# Patient Record
Sex: FEMALE | Race: WHITE | NOT HISPANIC OR LATINO | Employment: OTHER | URBAN - METROPOLITAN AREA
[De-identification: names, ages, dates, MRNs, and addresses within clinical notes are randomized per-mention and may not be internally consistent; named-entity substitution may affect disease eponyms.]

---

## 2023-07-18 ENCOUNTER — NURSING HOME VISIT (OUTPATIENT)
Dept: POST ACUTE CARE | Facility: EXTERNAL LOCATION | Age: 78
End: 2023-07-18
Payer: MEDICARE

## 2023-07-18 VITALS
DIASTOLIC BLOOD PRESSURE: 74 MMHG | BODY MASS INDEX: 24.45 KG/M2 | RESPIRATION RATE: 18 BRPM | SYSTOLIC BLOOD PRESSURE: 122 MMHG | HEIGHT: 63 IN | TEMPERATURE: 97.7 F | HEART RATE: 71 BPM | WEIGHT: 138 LBS | OXYGEN SATURATION: 95 %

## 2023-07-18 DIAGNOSIS — I48.91 ATRIAL FIBRILLATION WITH RVR (MULTI): ICD-10-CM

## 2023-07-18 DIAGNOSIS — Z74.09 IMPAIRED FUNCTIONAL MOBILITY AND ENDURANCE: ICD-10-CM

## 2023-07-18 DIAGNOSIS — M62.838 MUSCLE SPASM OF RIGHT LEG: ICD-10-CM

## 2023-07-18 DIAGNOSIS — J18.9 COMMUNITY ACQUIRED PNEUMONIA OF LEFT LOWER LOBE OF LUNG: Primary | ICD-10-CM

## 2023-07-18 DIAGNOSIS — Z79.01 CURRENT USE OF ANTICOAGULANT THERAPY: ICD-10-CM

## 2023-07-18 PROCEDURE — 99310 SBSQ NF CARE HIGH MDM 45: CPT | Performed by: PHYSICIAN ASSISTANT

## 2023-07-18 ASSESSMENT — ENCOUNTER SYMPTOMS
FEVER: 0
DYSPNEA ON EXERTION: 0
DIFFICULTY BREATHING: 0
SHORTNESS OF BREATH: 0
COUGH: 1
WHEEZING: 0

## 2023-07-18 ASSESSMENT — COPD QUESTIONNAIRES: COPD: 1

## 2023-07-18 NOTE — PROGRESS NOTES
"07/18/23  Name: Nafisa Freitas  YOB: 1945    Chief complaint: Pneumonia.    HPI: This is a 77 year old  female who has a medical history remarkable for atrial fibrillation, hypertension, CVA with R sided contractures, hyperlipidemia, tobacco dependence, COPD. And cardiomyopathy. Patient was seen in there ER for evaluation of worsening sob with cough over the last 4 days. Patient has been receiving Ketamine infusions for for history of contractures. Patient has received 4 such infusions but missed fifth dose due to present illness. ECG showed atrial fibrillation with RVR. Chest x-ray showed pneumonia  and she was successfully treated with antibiotics, steroids, oxygen, and bronchodilators. Cardiology started her on Cardizem initially for atrial fibrillation with RVR and later switching to Metoprolol succinate. Echocardiogram show ejection fraction of 40%. Cardiac catherization which showed nonobstructive cardiac artery disease. She improved and was discharged to SNF for rehab.    Pneumonia  She complains of cough. There is no difficulty breathing, shortness of breath or wheezing. This is a new problem. The current episode started in the past 7 days. The problem occurs daily. The problem has been gradually improving. The cough is non-productive. Pertinent negatives include no chest pain, dyspnea on exertion or fever. Her symptoms are aggravated by nothing. Relieved by: oxygen. She reports significant improvement on treatment. Risk factors for lung disease include smoking/tobacco exposure. Her past medical history is significant for COPD.     Review of systems:   ROS negative except were noted in HPI.    Code Status: full code    /74   Pulse 71   Temp 36.5 °C (97.7 °F)   Resp 18   Ht 1.6 m (5' 3\")   Wt 62.6 kg (138 lb)   SpO2 95%   BMI 24.45 kg/m²      Physical Exam  Constitutional:       General: She is not in acute distress.     Appearance: Normal appearance.   HENT:      Head: " Normocephalic.      Nose: Nose normal. No congestion.      Mouth/Throat:      Mouth: Mucous membranes are moist.   Eyes:      Extraocular Movements: Extraocular movements intact.      Pupils: Pupils are equal, round, and reactive to light.   Cardiovascular:      Rate and Rhythm: Normal rate and regular rhythm.      Pulses: Normal pulses.   Pulmonary:      Effort: Pulmonary effort is normal.      Breath sounds: Normal breath sounds.   Abdominal:      General: Bowel sounds are normal. There is no distension.      Palpations: Abdomen is soft.      Tenderness: There is no abdominal tenderness. There is no guarding.   Genitourinary:     Comments: Voiding    Musculoskeletal:         General: No swelling or deformity. Normal range of motion.      Cervical back: Normal range of motion.   Lymphadenopathy:      Cervical: No cervical adenopathy.   Skin:     General: Skin is warm and dry.   Neurological:      General: No focal deficit present.      Mental Status: She is alert and oriented to person, place, and time.   Psychiatric:         Mood and Affect: Mood normal.         Behavior: Behavior normal.        Medications reviewed during visit at facility.  Apixaban 5 mg po bid  Atorvastatin 40 mg po daily  Baclofen 5 mg po tid  Jublia External Solution 10 % (Efinaconazole) apply to toe nails daily  Aspirin 81 mg po daily  Ammonium lactate 12 % apply to Apply to BUE topically two times a day for dry skin and apply to BLE topically every morning.   Metoprolol succinate 50 mg po daily    Labs reviewed at facility:  07/14/2023  5:50 AM 07/14/2023  6:23 AM Final  07/14/2023  6:56 AM NA     Facility: [facility] [place] ([id number]  Accession: [id number]:[facility]  Specimen Source: NA    Admitting Provider: Dr. Nas Mtz  Ordered Date: 07/14/2023 5:50 AM  Body Site: NA    Attending Provider: Dr. Nas Mtz  Ordering Provider: Kimberly Arroyo  Patient Class: Inpatient Encounter      Test Result Reference Out Of Range  Units Corrected/Cancelled   GLUCOSE  75  74 - 99   mg/dL     SODIUM  135  136 - 145  Low  mmol/L     POTASSIUM  4.7  3.5 - 5.3   mmol/L     CHLORIDE  103  98 - 107   mmol/L     BICARBONATE  25  21 - 32   mmol/L     ANION GAP  12  10 - 20   mmol/L     UREA NITROGEN  15  6 - 23   mg/dL     CREATININE  0.79  0.50 - 1.05   mg/dL     eGFR FEMALE  78  >90   mL/min/1.73m2     CALCIUM 7.9 8.6 - 10.3 Low mg/dL   07/11/2023  5:33 AM 07/11/2023  6:11 AM Final  07/11/2023  6:15 AM NA     Facility: [facility] ([id number])  Accession: [id number]  Specimen Source: NA    Admitting Provider: Dr. Nas Mtz  Ordered Date: 07/11/2023 5:33 AM  Body Site:     Attending Provider: Dr. Nas Mtz  Ordering Provider: Mendy Gale  Patient Class: Inpatient Encounter      Test Result Reference Out Of Range Units Corrected/Cancelled   WBC  16.8  4.4 - 11.3  High  x10E9/L     NUCLEATED RBC  0.0  0.0 - 0.0   /100 WBC     RBC  3.53  4.00 - 5.20  Low  x10E12/L     HGB  11.0  12.0 - 16.0  Low  g/dL     HCT  34.1  36.0 - 46.0  Low  %     MCV  97  80 - 100   fL     MCHC  32.3  32.0 - 36.0   g/dL     PLT  354  150 - 450   x10E9/L     RDW-CV  13.4  11.5 - 14.5   %      Assessment/Plan    Problem List Items Addressed This Visit       Community acquired pneumonia of left lower lobe of lung - Primary     Complete antibiotic course. Supplemental oxygen as needed. Not wearing oxygen today.         Muscle spasm of right leg     Baclofen 5 mg po q 8 hours. Planning on Botox injections with  Roxana Salgado MD         Atrial fibrillation with RVR (CMS/HCC)     Metoprolol succinate 50 mg po daily. Review labs. Schedule cardiology follow up.         Current use of anticoagulant therapy     Apixaban 5 mg po bid         Impaired functional mobility and endurance     Order PT and OT to assess and treat.            Time:  I spent 45 minutes or greater with the patient. Greater than 50% of this time was spent in counseling and or  coordination of care. The time includes prep time of reviewing vital signs, report from direct nursing staff and or therapists, hospital documentation, reviewing labs, radiographs, diagnostic tests and or consultations, time directly spent with the patient interviewing, examining, and education regarding diagnosis, treatments, and medications, as well as documentation in the electronic medical record, and reviewing the plan of care and any new orders with the patient, nursing staff and other staff directly related to the patients care.      Nas Granados PA-C

## 2023-07-18 NOTE — LETTER
"Patient: Nafisa Freitas  : 1945    Encounter Date: 2023  Name: Nafisa Freitas  YOB: 1945    Chief complaint: Pneumonia.    HPI: This is a 77 year old  female who has a medical history remarkable for atrial fibrillation, hypertension, CVA with R sided contractures, hyperlipidemia, tobacco dependence, COPD. And cardiomyopathy. Patient was seen in there ER for evaluation of worsening sob with cough over the last 4 days. Patient has been receiving Ketamine infusions for for history of contractures. Patient has received 4 such infusions but missed fifth dose due to present illness. ECG showed atrial fibrillation with RVR. Chest x-ray showed pneumonia  and she was successfully treated with antibiotics, steroids, oxygen, and bronchodilators. Cardiology started her on Cardizem initially for atrial fibrillation with RVR and later switching to Metoprolol succinate. Echocardiogram show ejection fraction of 40%. Cardiac catherization which showed nonobstructive cardiac artery disease. She improved and was discharged to SNF for rehab.    Pneumonia  She complains of cough. There is no difficulty breathing, shortness of breath or wheezing. This is a new problem. The current episode started in the past 7 days. The problem occurs daily. The problem has been gradually improving. The cough is non-productive. Pertinent negatives include no chest pain, dyspnea on exertion or fever. Her symptoms are aggravated by nothing. Relieved by: oxygen. She reports significant improvement on treatment. Risk factors for lung disease include smoking/tobacco exposure. Her past medical history is significant for COPD.     Review of systems:   ROS negative except were noted in HPI.    Code Status: full code    /74   Pulse 71   Temp 36.5 °C (97.7 °F)   Resp 18   Ht 1.6 m (5' 3\")   Wt 62.6 kg (138 lb)   SpO2 95%   BMI 24.45 kg/m²      Physical Exam  Constitutional:       General: She is not in " acute distress.     Appearance: Normal appearance.   HENT:      Head: Normocephalic.      Nose: Nose normal. No congestion.      Mouth/Throat:      Mouth: Mucous membranes are moist.   Eyes:      Extraocular Movements: Extraocular movements intact.      Pupils: Pupils are equal, round, and reactive to light.   Cardiovascular:      Rate and Rhythm: Normal rate and regular rhythm.      Pulses: Normal pulses.   Pulmonary:      Effort: Pulmonary effort is normal.      Breath sounds: Normal breath sounds.   Abdominal:      General: Bowel sounds are normal. There is no distension.      Palpations: Abdomen is soft.      Tenderness: There is no abdominal tenderness. There is no guarding.   Genitourinary:     Comments: Voiding    Musculoskeletal:         General: No swelling or deformity. Normal range of motion.      Cervical back: Normal range of motion.   Lymphadenopathy:      Cervical: No cervical adenopathy.   Skin:     General: Skin is warm and dry.   Neurological:      General: No focal deficit present.      Mental Status: She is alert and oriented to person, place, and time.   Psychiatric:         Mood and Affect: Mood normal.         Behavior: Behavior normal.        Medications reviewed during visit at facility.  Apixaban 5 mg po bid  Atorvastatin 40 mg po daily  Baclofen 5 mg po tid  Jublia External Solution 10 % (Efinaconazole) apply to toe nails daily  Aspirin 81 mg po daily  Ammonium lactate 12 % apply to Apply to BUE topically two times a day for dry skin and apply to BLE topically every morning.   Metoprolol succinate 50 mg po daily    Labs reviewed at facility:  07/14/2023  5:50 AM 07/14/2023  6:23 AM Final  07/14/2023  6:56 AM NA     Facility: [facility] ([id number])  Accession: [id number]:[facility]  Specimen Source: NA    Admitting Provider: Dr. Nas Mtz  Ordered Date: 07/14/2023 5:50 AM  Body Site: NA    Attending Provider: Dr. Nas Mtz  Ordering Provider: Kimberly Arroyo  Patient  Class: Inpatient Encounter      Test Result Reference Out Of Range Units Corrected/Cancelled   GLUCOSE  75  74 - 99   mg/dL     SODIUM  135  136 - 145  Low  mmol/L     POTASSIUM  4.7  3.5 - 5.3   mmol/L     CHLORIDE  103  98 - 107   mmol/L     BICARBONATE  25  21 - 32   mmol/L     ANION GAP  12  10 - 20   mmol/L     UREA NITROGEN  15  6 - 23   mg/dL     CREATININE  0.79  0.50 - 1.05   mg/dL     eGFR FEMALE  78  >90   mL/min/1.73m2     CALCIUM 7.9 8.6 - 10.3 Low mg/dL   07/11/2023  5:33 AM 07/11/2023  6:11 AM Final  07/11/2023  6:15 AM NA     Facility: [facility] ([id number])  Accession: [id number]  Specimen Source: NA    Admitting Provider: Dr. Nas Mtz  Ordered Date: 07/11/2023 5:33 AM  Body Site: NA    Attending Provider: Dr. Nas Mtz  Ordering Provider: Mendy Gale  Patient Class: Inpatient Encounter      Test Result Reference Out Of Range Units Corrected/Cancelled   WBC  16.8  4.4 - 11.3  High  x10E9/L     NUCLEATED RBC  0.0  0.0 - 0.0   /100 WBC     RBC  3.53  4.00 - 5.20  Low  x10E12/L     HGB  11.0  12.0 - 16.0  Low  g/dL     HCT  34.1  36.0 - 46.0  Low  %     MCV  97  80 - 100   fL     MCHC  32.3  32.0 - 36.0   g/dL     PLT  354  150 - 450   x10E9/L     RDW-CV  13.4  11.5 - 14.5   %      Assessment/Plan   Problem List Items Addressed This Visit       Community acquired pneumonia of left lower lobe of lung - Primary     Complete antibiotic course. Supplemental oxygen as needed. Not wearing oxygen today.         Muscle spasm of right leg     Baclofen 5 mg po q 8 hours. Planning on Botox injections with  Roxana Salgado MD         Atrial fibrillation with RVR (CMS/HCC)     Metoprolol succinate 50 mg po daily. Review labs. Schedule cardiology follow up.         Current use of anticoagulant therapy     Apixaban 5 mg po bid         Impaired functional mobility and endurance     Order PT and OT to assess and treat.            Time:  I spent 45 minutes or greater with the patient.  Greater than 50% of this time was spent in counseling and or coordination of care. The time includes prep time of reviewing vital signs, report from direct nursing staff and or therapists, hospital documentation, reviewing labs, radiographs, diagnostic tests and or consultations, time directly spent with the patient interviewing, examining, and education regarding diagnosis, treatments, and medications, as well as documentation in the electronic medical record, and reviewing the plan of care and any new orders with the patient, nursing staff and other staff directly related to the patients care.      Nas Granados PA-C       Electronically Signed By: Nas Granados PA-C   7/18/23  8:38 PM

## 2023-07-20 ENCOUNTER — NURSING HOME VISIT (OUTPATIENT)
Dept: POST ACUTE CARE | Facility: EXTERNAL LOCATION | Age: 78
End: 2023-07-20
Payer: MEDICARE

## 2023-07-20 VITALS
TEMPERATURE: 98.2 F | HEART RATE: 74 BPM | BODY MASS INDEX: 24.45 KG/M2 | HEIGHT: 63 IN | SYSTOLIC BLOOD PRESSURE: 115 MMHG | DIASTOLIC BLOOD PRESSURE: 72 MMHG | WEIGHT: 138 LBS | OXYGEN SATURATION: 96 % | RESPIRATION RATE: 16 BRPM

## 2023-07-20 DIAGNOSIS — J18.9 COMMUNITY ACQUIRED PNEUMONIA OF LEFT LOWER LOBE OF LUNG: ICD-10-CM

## 2023-07-20 DIAGNOSIS — Z74.09 IMPAIRED FUNCTIONAL MOBILITY AND ENDURANCE: ICD-10-CM

## 2023-07-20 DIAGNOSIS — I48.91 ATRIAL FIBRILLATION WITH RVR (MULTI): Primary | ICD-10-CM

## 2023-07-20 DIAGNOSIS — Z79.01 CURRENT USE OF ANTICOAGULANT THERAPY: ICD-10-CM

## 2023-07-20 PROCEDURE — 99309 SBSQ NF CARE MODERATE MDM 30: CPT | Performed by: PHYSICIAN ASSISTANT

## 2023-07-20 NOTE — PROGRESS NOTES
"7/20/2023  Name: Nafisa Freitas  YOB: 1945    Chief complaint: Follow up atrial fibrillation.    HPI: Patient seen and examined in her room. She is seated at time of exam. Patient reports she feels well and offers no new complaints. Heart rhythm regular with ventricular rate of 74 bpm. Denies fever, chills, sob, chest pain, palpitation, or dizziness. Pulse oximetry 96% on room air. She is afebrile . Sleeping at night.    Heart Problem  This is a new problem. The current episode started 1 to 4 weeks ago. The problem has been resolved. Pertinent negatives include no abdominal pain, change in bowel habit, chest pain, fatigue, fever, headaches, urinary symptoms or vertigo. Nothing aggravates the symptoms. Treatments tried: beta blocker. The treatment provided significant relief.     Review of systems:   ROS negative except were noted in HPI.    Code Status: full code    /72   Pulse 74   Temp 36.8 °C (98.2 °F)   Resp 16   Ht 1.6 m (5' 3\")   Wt 62.6 kg (138 lb)   SpO2 96%   BMI 24.45 kg/m²      Physical Exam  Constitutional:       General: She is not in acute distress.     Appearance: Normal appearance.   HENT:      Head: Normocephalic.      Nose: Nose normal. No congestion.      Mouth/Throat:      Mouth: Mucous membranes are moist.   Eyes:      Extraocular Movements: Extraocular movements intact.      Pupils: Pupils are equal, round, and reactive to light.   Cardiovascular:      Rate and Rhythm: Normal rate and regular rhythm.      Pulses: Normal pulses.   Pulmonary:      Effort: Pulmonary effort is normal.      Breath sounds: Normal breath sounds.   Abdominal:      General: Bowel sounds are normal. There is no distension.      Palpations: Abdomen is soft.      Tenderness: There is no abdominal tenderness. There is no guarding.   Genitourinary:     Comments: Voiding     Musculoskeletal:         General: No swelling or deformity. Normal range of motion.      Cervical back: Normal range of " motion.   Lymphadenopathy:      Cervical: No cervical adenopathy.   Skin:     General: Skin is warm and dry.   Neurological:      General: No focal deficit present.      Mental Status: She is alert and oriented to person, place, and time.   Psychiatric:         Mood and Affect: Mood normal.         Behavior: Behavior normal.     Medications reviewed during visit at facility.  Apixaban 5 mg po bid  Atorvastatin 40 mg po daily  Baclofen 5 mg po tid  Jublia External Solution 10 % (Efinaconazole) apply to toe nails daily  Aspirin 81 mg po daily  Ammonium lactate 12 % apply to Apply to BUE topically two times a day for dry skin and apply to BLE topically every morning.   Metoprolol succinate 50 mg po daily  Labs reviewed at facility:    Assessment/Plan    Problem List Items Addressed This Visit       Community acquired pneumonia of left lower lobe of lung     Resolved. Monitor oxygen needs.          Atrial fibrillation with RVR (CMS/HCC) - Primary     Metoprolol succinate 50 mg po daily. Review labs. Schedule cardiology follow up.          Current use of anticoagulant therapy     Apixaban 5 mg po bid          Impaired functional mobility and endurance     Minimal assistance needed for ADL's. Ambulatory with steady gait. Discuss progress with physical therapy.            Time:    Nas Granados PA-C

## 2023-07-20 NOTE — LETTER
"Patient: Nafisa Freitas  : 1945    Encounter Date: 2023  Name: Nafisa Freitas  YOB: 1945    Chief complaint: Follow up atrial fibrillation.    HPI: Patient seen and examined in her room. She is seated at time of exam. Patient reports she feels well and offers no new complaints. Heart rhythm regular with ventricular rate of 74 bpm. Denies fever, chills, sob, chest pain, palpitation, or dizziness. Pulse oximetry 96% on room air. She is afebrile . Sleeping at night.    Heart Problem  This is a new problem. The current episode started 1 to 4 weeks ago. The problem has been resolved. Pertinent negatives include no abdominal pain, change in bowel habit, chest pain, fatigue, fever, headaches, urinary symptoms or vertigo. Nothing aggravates the symptoms. Treatments tried: beta blocker. The treatment provided significant relief.     Review of systems:   ROS negative except were noted in HPI.    Code Status: full code    /72   Pulse 74   Temp 36.8 °C (98.2 °F)   Resp 16   Ht 1.6 m (5' 3\")   Wt 62.6 kg (138 lb)   SpO2 96%   BMI 24.45 kg/m²      Physical Exam  Constitutional:       General: She is not in acute distress.     Appearance: Normal appearance.   HENT:      Head: Normocephalic.      Nose: Nose normal. No congestion.      Mouth/Throat:      Mouth: Mucous membranes are moist.   Eyes:      Extraocular Movements: Extraocular movements intact.      Pupils: Pupils are equal, round, and reactive to light.   Cardiovascular:      Rate and Rhythm: Normal rate and regular rhythm.      Pulses: Normal pulses.   Pulmonary:      Effort: Pulmonary effort is normal.      Breath sounds: Normal breath sounds.   Abdominal:      General: Bowel sounds are normal. There is no distension.      Palpations: Abdomen is soft.      Tenderness: There is no abdominal tenderness. There is no guarding.   Genitourinary:     Comments: Voiding     Musculoskeletal:         General: No swelling or " deformity. Normal range of motion.      Cervical back: Normal range of motion.   Lymphadenopathy:      Cervical: No cervical adenopathy.   Skin:     General: Skin is warm and dry.   Neurological:      General: No focal deficit present.      Mental Status: She is alert and oriented to person, place, and time.   Psychiatric:         Mood and Affect: Mood normal.         Behavior: Behavior normal.     Medications reviewed during visit at facility.  Apixaban 5 mg po bid  Atorvastatin 40 mg po daily  Baclofen 5 mg po tid  Jublia External Solution 10 % (Efinaconazole) apply to toe nails daily  Aspirin 81 mg po daily  Ammonium lactate 12 % apply to Apply to BUE topically two times a day for dry skin and apply to BLE topically every morning.   Metoprolol succinate 50 mg po daily  Labs reviewed at facility:    Assessment/Plan   Problem List Items Addressed This Visit       Community acquired pneumonia of left lower lobe of lung     Resolved. Monitor oxygen needs.          Atrial fibrillation with RVR (CMS/HCC) - Primary     Metoprolol succinate 50 mg po daily. Review labs. Schedule cardiology follow up.          Current use of anticoagulant therapy     Apixaban 5 mg po bid          Impaired functional mobility and endurance     Minimal assistance needed for ADL's. Ambulatory with steady gait. Discuss progress with physical therapy.            Time:    Nas Granados PA-C       Electronically Signed By: Nas Granados PA-C   7/23/23  3:40 PM

## 2023-07-23 ASSESSMENT — ENCOUNTER SYMPTOMS
FATIGUE: 0
VERTIGO: 0
FEVER: 0
CHANGE IN BOWEL HABIT: 0
ABDOMINAL PAIN: 0
HEADACHES: 0

## 2023-07-23 NOTE — ASSESSMENT & PLAN NOTE
Minimal assistance needed for ADL's. Ambulatory with steady gait. Discuss progress with physical therapy.

## 2023-11-13 ENCOUNTER — LAB (OUTPATIENT)
Dept: LAB | Facility: LAB | Age: 78
End: 2023-11-13
Payer: MEDICARE

## 2023-11-13 DIAGNOSIS — E78.5 HYPERLIPIDEMIA, UNSPECIFIED: ICD-10-CM

## 2023-11-13 DIAGNOSIS — I10 ESSENTIAL (PRIMARY) HYPERTENSION: Primary | ICD-10-CM

## 2023-11-13 DIAGNOSIS — Z13.29 ENCOUNTER FOR SCREENING FOR OTHER SUSPECTED ENDOCRINE DISORDER: ICD-10-CM

## 2023-11-13 LAB
ALBUMIN SERPL BCP-MCNC: 4 G/DL (ref 3.4–5)
ALP SERPL-CCNC: 82 U/L (ref 33–136)
ALT SERPL W P-5'-P-CCNC: 23 U/L (ref 7–45)
ANION GAP SERPL CALC-SCNC: 10 MMOL/L (ref 10–20)
AST SERPL W P-5'-P-CCNC: 20 U/L (ref 9–39)
BASOPHILS # BLD AUTO: 0.06 X10*3/UL (ref 0–0.1)
BASOPHILS NFR BLD AUTO: 0.9 %
BILIRUB SERPL-MCNC: 0.5 MG/DL (ref 0–1.2)
BUN SERPL-MCNC: 14 MG/DL (ref 6–23)
CALCIUM SERPL-MCNC: 9.2 MG/DL (ref 8.6–10.3)
CHLORIDE SERPL-SCNC: 106 MMOL/L (ref 98–107)
CHOLEST SERPL-MCNC: 119 MG/DL (ref 0–199)
CHOLESTEROL/HDL RATIO: 2.4
CO2 SERPL-SCNC: 31 MMOL/L (ref 21–32)
CREAT SERPL-MCNC: 1.08 MG/DL (ref 0.5–1.05)
EOSINOPHIL # BLD AUTO: 0.28 X10*3/UL (ref 0–0.4)
EOSINOPHIL NFR BLD AUTO: 4.3 %
ERYTHROCYTE [DISTWIDTH] IN BLOOD BY AUTOMATED COUNT: 13 % (ref 11.5–14.5)
GFR SERPL CREATININE-BSD FRML MDRD: 53 ML/MIN/1.73M*2
GLUCOSE SERPL-MCNC: 89 MG/DL (ref 74–99)
HCT VFR BLD AUTO: 43.7 % (ref 36–46)
HDLC SERPL-MCNC: 50.5 MG/DL
HGB BLD-MCNC: 14.1 G/DL (ref 12–16)
IMM GRANULOCYTES # BLD AUTO: 0.03 X10*3/UL (ref 0–0.5)
IMM GRANULOCYTES NFR BLD AUTO: 0.5 % (ref 0–0.9)
LDLC SERPL CALC-MCNC: 47 MG/DL
LYMPHOCYTES # BLD AUTO: 1.72 X10*3/UL (ref 0.8–3)
LYMPHOCYTES NFR BLD AUTO: 26.5 %
MCH RBC QN AUTO: 30.7 PG (ref 26–34)
MCHC RBC AUTO-ENTMCNC: 32.3 G/DL (ref 32–36)
MCV RBC AUTO: 95 FL (ref 80–100)
MONOCYTES # BLD AUTO: 0.61 X10*3/UL (ref 0.05–0.8)
MONOCYTES NFR BLD AUTO: 9.4 %
NEUTROPHILS # BLD AUTO: 3.8 X10*3/UL (ref 1.6–5.5)
NEUTROPHILS NFR BLD AUTO: 58.4 %
NON HDL CHOLESTEROL: 69 MG/DL (ref 0–149)
NRBC BLD-RTO: 0 /100 WBCS (ref 0–0)
PLATELET # BLD AUTO: 314 X10*3/UL (ref 150–450)
POTASSIUM SERPL-SCNC: 4.1 MMOL/L (ref 3.5–5.3)
PROT SERPL-MCNC: 6 G/DL (ref 6.4–8.2)
RBC # BLD AUTO: 4.6 X10*6/UL (ref 4–5.2)
SODIUM SERPL-SCNC: 143 MMOL/L (ref 136–145)
TRIGL SERPL-MCNC: 110 MG/DL (ref 0–149)
TSH SERPL-ACNC: 3.67 MIU/L (ref 0.44–3.98)
VLDL: 22 MG/DL (ref 0–40)
WBC # BLD AUTO: 6.5 X10*3/UL (ref 4.4–11.3)

## 2023-11-13 PROCEDURE — 36415 COLL VENOUS BLD VENIPUNCTURE: CPT

## 2023-11-13 PROCEDURE — 85025 COMPLETE CBC W/AUTO DIFF WBC: CPT

## 2023-11-13 PROCEDURE — 84443 ASSAY THYROID STIM HORMONE: CPT

## 2023-11-13 PROCEDURE — 80053 COMPREHEN METABOLIC PANEL: CPT

## 2023-11-13 PROCEDURE — 80061 LIPID PANEL: CPT

## 2024-09-03 ENCOUNTER — LAB (OUTPATIENT)
Dept: LAB | Facility: LAB | Age: 79
End: 2024-09-03
Payer: MEDICARE

## 2024-09-03 DIAGNOSIS — Z13.29 ENCOUNTER FOR SCREENING FOR OTHER SUSPECTED ENDOCRINE DISORDER: ICD-10-CM

## 2024-09-03 DIAGNOSIS — I10 ESSENTIAL (PRIMARY) HYPERTENSION: Primary | ICD-10-CM

## 2024-09-03 DIAGNOSIS — E78.5 HYPERLIPIDEMIA, UNSPECIFIED: ICD-10-CM

## 2024-09-03 LAB
ALBUMIN SERPL BCP-MCNC: 4 G/DL (ref 3.4–5)
ALP SERPL-CCNC: 71 U/L (ref 33–136)
ALT SERPL W P-5'-P-CCNC: 23 U/L (ref 7–45)
ANION GAP SERPL CALC-SCNC: 13 MMOL/L (ref 10–20)
AST SERPL W P-5'-P-CCNC: 40 U/L (ref 9–39)
BASOPHILS # BLD AUTO: 0.02 X10*3/UL (ref 0–0.1)
BASOPHILS NFR BLD AUTO: 0.5 %
BILIRUB SERPL-MCNC: 0.4 MG/DL (ref 0–1.2)
BUN SERPL-MCNC: 10 MG/DL (ref 6–23)
CALCIUM SERPL-MCNC: 8.7 MG/DL (ref 8.6–10.3)
CHLORIDE SERPL-SCNC: 99 MMOL/L (ref 98–107)
CHOLEST SERPL-MCNC: 99 MG/DL (ref 0–199)
CHOLESTEROL/HDL RATIO: 2.3
CO2 SERPL-SCNC: 25 MMOL/L (ref 21–32)
CREAT SERPL-MCNC: 1.12 MG/DL (ref 0.5–1.05)
EGFRCR SERPLBLD CKD-EPI 2021: 51 ML/MIN/1.73M*2
EOSINOPHIL # BLD AUTO: 0.01 X10*3/UL (ref 0–0.4)
EOSINOPHIL NFR BLD AUTO: 0.3 %
ERYTHROCYTE [DISTWIDTH] IN BLOOD BY AUTOMATED COUNT: 13.1 % (ref 11.5–14.5)
GLUCOSE SERPL-MCNC: 93 MG/DL (ref 74–99)
HCT VFR BLD AUTO: 41.7 % (ref 36–46)
HDLC SERPL-MCNC: 42.4 MG/DL
HGB BLD-MCNC: 14.3 G/DL (ref 12–16)
IMM GRANULOCYTES # BLD AUTO: 0.01 X10*3/UL (ref 0–0.5)
IMM GRANULOCYTES NFR BLD AUTO: 0.3 % (ref 0–0.9)
LDLC SERPL CALC-MCNC: 41 MG/DL
LYMPHOCYTES # BLD AUTO: 0.64 X10*3/UL (ref 0.8–3)
LYMPHOCYTES NFR BLD AUTO: 16.4 %
MCH RBC QN AUTO: 31.3 PG (ref 26–34)
MCHC RBC AUTO-ENTMCNC: 34.3 G/DL (ref 32–36)
MCV RBC AUTO: 91 FL (ref 80–100)
MONOCYTES # BLD AUTO: 0.71 X10*3/UL (ref 0.05–0.8)
MONOCYTES NFR BLD AUTO: 18.2 %
NEUTROPHILS # BLD AUTO: 2.51 X10*3/UL (ref 1.6–5.5)
NEUTROPHILS NFR BLD AUTO: 64.3 %
NON HDL CHOLESTEROL: 57 MG/DL (ref 0–149)
NRBC BLD-RTO: 0 /100 WBCS (ref 0–0)
PLATELET # BLD AUTO: 189 X10*3/UL (ref 150–450)
POTASSIUM SERPL-SCNC: 4 MMOL/L (ref 3.5–5.3)
PROT SERPL-MCNC: 6.2 G/DL (ref 6.4–8.2)
RBC # BLD AUTO: 4.57 X10*6/UL (ref 4–5.2)
SODIUM SERPL-SCNC: 133 MMOL/L (ref 136–145)
TRIGL SERPL-MCNC: 78 MG/DL (ref 0–149)
TSH SERPL-ACNC: 4.42 MIU/L (ref 0.44–3.98)
VLDL: 16 MG/DL (ref 0–40)
WBC # BLD AUTO: 3.9 X10*3/UL (ref 4.4–11.3)

## 2024-09-03 PROCEDURE — 80061 LIPID PANEL: CPT

## 2024-09-03 PROCEDURE — 36415 COLL VENOUS BLD VENIPUNCTURE: CPT

## 2024-09-03 PROCEDURE — 80053 COMPREHEN METABOLIC PANEL: CPT

## 2024-09-03 PROCEDURE — 84443 ASSAY THYROID STIM HORMONE: CPT

## 2024-09-03 PROCEDURE — 85025 COMPLETE CBC W/AUTO DIFF WBC: CPT
